# Patient Record
Sex: FEMALE | Race: WHITE | NOT HISPANIC OR LATINO | Employment: PART TIME | URBAN - METROPOLITAN AREA
[De-identification: names, ages, dates, MRNs, and addresses within clinical notes are randomized per-mention and may not be internally consistent; named-entity substitution may affect disease eponyms.]

---

## 2022-05-24 ENCOUNTER — HOSPITAL ENCOUNTER (EMERGENCY)
Facility: HOSPITAL | Age: 32
Discharge: OTHER INSTITUTION | End: 2022-05-25
Attending: EMERGENCY MEDICINE | Admitting: EMERGENCY MEDICINE
Payer: COMMERCIAL

## 2022-05-24 DIAGNOSIS — F31.9 BIPOLAR DISORDER (HCC): ICD-10-CM

## 2022-05-24 DIAGNOSIS — F41.9 ANXIETY: ICD-10-CM

## 2022-05-24 DIAGNOSIS — F43.10 PTSD (POST-TRAUMATIC STRESS DISORDER): ICD-10-CM

## 2022-05-24 DIAGNOSIS — R45.851 DEPRESSION WITH SUICIDAL IDEATION: Primary | ICD-10-CM

## 2022-05-24 DIAGNOSIS — F32.A DEPRESSION WITH SUICIDAL IDEATION: Primary | ICD-10-CM

## 2022-05-24 LAB
AMPHETAMINES SERPL QL SCN: NEGATIVE
BARBITURATES UR QL: NEGATIVE
BENZODIAZ UR QL: NEGATIVE
COCAINE UR QL: NEGATIVE
ETHANOL EXG-MCNC: 0 MG/DL
EXT PREG TEST URINE: NEGATIVE
EXT. CONTROL ED NAV: NORMAL
FLUAV RNA RESP QL NAA+PROBE: NEGATIVE
FLUBV RNA RESP QL NAA+PROBE: NEGATIVE
LITHIUM SERPL-SCNC: 0.4 MMOL/L (ref 0.5–1)
METHADONE UR QL: NEGATIVE
OPIATES UR QL SCN: NEGATIVE
OXYCODONE+OXYMORPHONE UR QL SCN: NEGATIVE
PCP UR QL: NEGATIVE
RSV RNA RESP QL NAA+PROBE: NEGATIVE
SARS-COV-2 RNA RESP QL NAA+PROBE: NEGATIVE
THC UR QL: NEGATIVE

## 2022-05-24 PROCEDURE — 99285 EMERGENCY DEPT VISIT HI MDM: CPT | Performed by: EMERGENCY MEDICINE

## 2022-05-24 PROCEDURE — 99285 EMERGENCY DEPT VISIT HI MDM: CPT

## 2022-05-24 PROCEDURE — 81025 URINE PREGNANCY TEST: CPT

## 2022-05-24 PROCEDURE — 80178 ASSAY OF LITHIUM: CPT

## 2022-05-24 PROCEDURE — 82075 ASSAY OF BREATH ETHANOL: CPT

## 2022-05-24 PROCEDURE — 80307 DRUG TEST PRSMV CHEM ANLYZR: CPT

## 2022-05-24 PROCEDURE — 36415 COLL VENOUS BLD VENIPUNCTURE: CPT

## 2022-05-24 PROCEDURE — 0241U HB NFCT DS VIR RESP RNA 4 TRGT: CPT

## 2022-05-24 RX ORDER — ACYCLOVIR 400 MG/1
1 TABLET ORAL 2 TIMES DAILY
COMMUNITY
Start: 2022-02-10

## 2022-05-24 RX ORDER — TRAZODONE HYDROCHLORIDE 50 MG/1
50 TABLET ORAL DAILY PRN
COMMUNITY
Start: 2022-03-01

## 2022-05-24 RX ORDER — ACYCLOVIR 200 MG/1
400 CAPSULE ORAL EVERY 12 HOURS SCHEDULED
Status: DISCONTINUED | OUTPATIENT
Start: 2022-05-24 | End: 2022-05-25 | Stop reason: HOSPADM

## 2022-05-24 RX ORDER — LAMOTRIGINE 200 MG/1
200 TABLET ORAL DAILY
COMMUNITY
Start: 2022-04-14

## 2022-05-24 RX ORDER — LITHIUM CARBONATE 300 MG
300 TABLET ORAL 2 TIMES DAILY
COMMUNITY
Start: 2022-04-14

## 2022-05-24 RX ORDER — METHYLPHENIDATE HYDROCHLORIDE 18 MG/1
18 TABLET, EXTENDED RELEASE ORAL DAILY
COMMUNITY
Start: 2022-05-17 | End: 2022-07-16

## 2022-05-24 RX ORDER — LAMOTRIGINE 100 MG/1
200 TABLET ORAL ONCE
Status: COMPLETED | OUTPATIENT
Start: 2022-05-25 | End: 2022-05-25

## 2022-05-24 RX ORDER — LITHIUM CARBONATE 300 MG/1
300 CAPSULE ORAL 2 TIMES DAILY WITH MEALS
Status: DISCONTINUED | OUTPATIENT
Start: 2022-05-24 | End: 2022-05-25 | Stop reason: HOSPADM

## 2022-05-24 RX ORDER — TRAZODONE HYDROCHLORIDE 50 MG/1
50 TABLET ORAL ONCE
Status: COMPLETED | OUTPATIENT
Start: 2022-05-24 | End: 2022-05-24

## 2022-05-24 RX ORDER — METHYLPHENIDATE HYDROCHLORIDE 10 MG/1
18 TABLET ORAL ONCE
Status: DISCONTINUED | OUTPATIENT
Start: 2022-05-25 | End: 2022-05-25 | Stop reason: HOSPADM

## 2022-05-24 RX ADMIN — TRAZODONE HYDROCHLORIDE 50 MG: 50 TABLET ORAL at 22:08

## 2022-05-24 RX ADMIN — LITHIUM CARBONATE 300 MG: 300 CAPSULE, GELATIN COATED ORAL at 17:31

## 2022-05-24 RX ADMIN — ACYCLOVIR 400 MG: 200 CAPSULE ORAL at 17:31

## 2022-05-24 NOTE — ED NOTES
Pt comes to the ed after calling the national suicide hotline  Pt sees Dr Oziel Goldberg outpatient and has a therapist  Pt had a med change in Feb or March  Nothing since then  Pt reports increased depression and anxiety  She was at [facility] partial program Transitions a couple months ago but felt worse attending the program  Pt reports having thoughts of suicide and left work today due to overwhelming feelings of sadness and hopelessness  Pt said her family and  care about her but don't understand her depression and therefore are not helpful  Pt is med compliant and denies any D&A use or legal issues  No medical issues reported  Pt denies homicidal ideations as well as hallucinations  Pt reports cutting as a child but not since  Pt has difficulty focusing  She reports feeling like a burden to her   They had an argument last night about pts depression and not seeing any results through treatment yet  Pt reports appetite as good and sleep is good with use of Trazadone  She is requesting inpatient psych tx and will sign a 201  Pt is calm and cooperative

## 2022-05-24 NOTE — ED PROVIDER NOTES
History  Chief Complaint   Patient presents with    Suicidal     Ideation  Has a plan in mind (Helium), but not exact time or timeframe  States that if given the right emotional state, she "would not feel safe alone" Pt has a Hx of psych - therapy, and on Trazadone qHS, Lamictal, and Lithium  Stopped Prozac in Feb/March  Patient is a 34year old female with PMHx of depression, anxiety, bipolar disorder, borderline personality disorder, PTSD, binge eating disorder, presenting to the ED for evaluation of suicidal ideation  Patient states that she has been thinking of suicide for a very long time    Today on her way home from work, she was going to  a helium tank and inhaled the entire take an attempted suicide  Instead, she called her , who instructed her to call crisis worker which she did  Patient was instructed to present to the ED for evaluation  Patient reports long history of psychiatric illness  She was previously in an inpatient center, but states this did not help her that much  She reports recently being diagnosed with carpal tunnel syndrome, occasionally experiences pain to the wrist from this, but well controlled with Tylenol and Motrin at home  She has no other somatic complaints  Patient has no homicidal ideation  Prior to Admission Medications   Prescriptions Last Dose Informant Patient Reported? Taking? Methylphenidate HCl ER 18 MG TB24   Yes Yes   Sig: Take 18 mg by mouth in the morning  Multiple Vitamins-Minerals (MULTIVITAL-M PO)   Yes No   Sig: Take 1 tablet by mouth daily   acyclovir (ZOVIRAX) 400 MG tablet   Yes Yes   Sig: Take 1 tablet by mouth in the morning and 1 tablet in the evening  lamoTRIgine (LaMICtal) 200 MG tablet   Yes Yes   Sig: Take 200 mg by mouth in the morning  lithium 300 MG tablet   Yes Yes   Sig: Take 300 mg by mouth in the morning and 300 mg in the evening     traZODone (DESYREL) 50 mg tablet   Yes Yes   Sig: Take 50 mg by mouth as needed in the morning  Facility-Administered Medications: None       History reviewed  No pertinent past medical history  History reviewed  No pertinent surgical history  History reviewed  No pertinent family history  I have reviewed and agree with the history as documented  E-Cigarette/Vaping     E-Cigarette/Vaping Substances           Review of Systems   Constitutional: Negative for chills and fever  HENT: Negative for ear pain and sore throat  Eyes: Negative for pain and visual disturbance  Respiratory: Negative for cough and shortness of breath  Cardiovascular: Negative for chest pain and palpitations  Gastrointestinal: Negative for abdominal pain and vomiting  Genitourinary: Negative for dysuria and hematuria  Musculoskeletal: Positive for myalgias  Negative for back pain  Skin: Negative for color change and rash  Neurological: Negative for seizures and syncope  All other systems reviewed and are negative  Physical Exam  ED Triage Vitals   Temperature Pulse Respirations Blood Pressure SpO2   05/24/22 1253 05/24/22 1253 05/24/22 1253 05/24/22 1253 05/24/22 1253   98 5 °F (36 9 °C) 64 16 (!) 175/81 97 %      Temp Source Heart Rate Source Patient Position - Orthostatic VS BP Location FiO2 (%)   05/24/22 1253 05/24/22 1253 05/24/22 1253 05/24/22 1253 --   Oral Monitor Sitting Right arm       Pain Score       05/24/22 1600       No Pain             Orthostatic Vital Signs  Vitals:    05/24/22 1600 05/24/22 1827 05/24/22 2210 05/25/22 0615   BP: 157/99 153/73 143/64 135/78   Pulse: 69 58 56 68   Patient Position - Orthostatic VS: Sitting Lying Sitting Sitting       Physical Exam  Vitals and nursing note reviewed  Constitutional:       Appearance: He is well-developed  HENT:      Head: Normocephalic and atraumatic  Eyes:      Conjunctiva/sclera: Conjunctivae normal    Cardiovascular:      Rate and Rhythm: Normal rate and regular rhythm        Heart sounds: No murmur heard   Pulmonary:      Effort: Pulmonary effort is normal  No respiratory distress  Breath sounds: Normal breath sounds  Abdominal:      Palpations: Abdomen is soft  Tenderness: There is no abdominal tenderness  Musculoskeletal:      Cervical back: Neck supple  Skin:     General: Skin is warm and dry  Neurological:      Mental Status: He is alert  ED Medications  Medications   traZODone (DESYREL) tablet 50 mg (50 mg Oral Given 5/24/22 2208)   lamoTRIgine (LaMICtal) tablet 200 mg (200 mg Oral Given 5/25/22 0926)       Diagnostic Studies  Results Reviewed     Procedure Component Value Units Date/Time    Lithium level [[id number]]  (Abnormal) Collected: 05/24/22 1910    Lab Status: Final result Specimen: Blood from Hand, Left Updated: 05/24/22 1941     Lithium Lvl 0 4 mmol/L     POCT alcohol breath test [[id number]]  (Normal) Resulted: 05/24/22 1345    Lab Status: Edited Result - FINAL Updated: 05/24/22 1528     EXTBreath Alcohol 0 000    COVID/FLU/RSV - 2 hour TAT [[id number]]  (Normal) Collected: 05/24/22 1346    Lab Status: Final result Specimen: Nares from Nose Updated: 05/24/22 1434     SARS-CoV-2 Negative     INFLUENZA A PCR Negative     INFLUENZA B PCR Negative     RSV PCR Negative    Narrative:      FOR PEDIATRIC PATIENTS - copy/paste COVID Guidelines URL to browser: [email or web address]  ashx    SARS-CoV-2 assay is a Nucleic Acid Amplification assay intended for the  qualitative detection of nucleic acid from SARS-CoV-2 in nasopharyngeal  swabs  Results are for the presumptive identification of SARS-CoV-2 RNA  Positive results are indicative of infection with SARS-CoV-2, the virus  causing COVID-19, but do not rule out bacterial infection or co-infection  with other viruses  Laboratories within the [place] and its  territories are required to report all positive results to the appropriate  public health authorities   Negative results do not preclude SARS-CoV-2  infection and should not be used as the sole basis for treatment or other  patient management decisions  Negative results must be combined with  clinical observations, patient history, and epidemiological information  This test has not been FDA cleared or approved  This test has been authorized by FDA under an Emergency Use Authorization  (EUA)  This test is only authorized for the duration of time the  declaration that circumstances exist justifying the authorization of the  emergency use of an in vitro diagnostic tests for detection of SARS-CoV-2  virus and/or diagnosis of COVID-19 infection under section 564(b)(1) of  the Act, 21 U  S C  756WVQ-6(J)(8), unless the authorization is terminated  or revoked sooner  The test has been validated but independent review by FDA  and CLIA is pending  Test performed using [employer] Gene[employer]pert: This RT-PCR assay targets N2,  a region unique to SARS-CoV-2  A conserved region in the E-gene was chosen  for pan-Sarbecovirus detection which includes SARS-CoV-2  Rapid drug screen, urine [[id number]]  (Normal) Collected: 05/24/22 1346    Lab Status: Final result Specimen: Urine, Clean Catch Updated: 05/24/22 1428     Amph/Meth UR Negative     Barbiturate Ur Negative     Benzodiazepine Urine Negative     Cocaine Urine Negative     Methadone Urine Negative     Opiate Urine Negative     PCP Ur Negative     THC Urine Negative     Oxycodone Urine Negative    Narrative:      FOR MEDICAL PURPOSES ONLY  IF CONFIRMATION NEEDED PLEASE CONTACT THE LAB WITHIN 5 DAYS      Drug Screen Cutoff Levels:  AMPHETAMINE/METHAMPHETAMINES  1000 ng/mL  BARBITURATES     200 ng/mL  BENZODIAZEPINES     200 ng/mL  COCAINE      300 ng/mL  METHADONE      300 ng/mL  OPIATES      300 ng/mL  PHENCYCLIDINE     25 ng/mL  THC       50 ng/mL  OXYCODONE      100 ng/mL    POCT pregnancy, urine [[id number]]  (Normal) Resulted: 05/24/22 1346    Lab Status: Final result Updated: 05/24/22 1346     EXT PREG TEST UR (Ref: Negative) negative     Control valid                 No orders to display         Procedures  Procedures      ED Course  ED Course as of 05/25/22 1156   Tue May 24, 2022   1505 Patient signed a 12 order with ED crisis worker  Bed search pending  [phone] Patient accepted at [facility] by Dr Jeri Brandt  [place] tomorrow morning 0930  Patient has been medically cleared for psychiatric treatment  Will need 302 if rescinds  ED crisis worker contacted; will evaluate at bedside  COVID swab, breath alcohol, and UDS ordered  201 order signed with the ED crisis worker  Bed search pending  Patient will be transferred to [facility] behavioral health tomorrow morning  Patient's medications were ordered for evening and tomorrow morning doses  Patient resting comfortably, no complaints  Care overturned to Dr Genaro Dominguez, pending further monitoring and transfer  MDM    Disposition  Final diagnoses:   Depression with suicidal ideation   Anxiety   PTSD (post-traumatic stress disorder)   Bipolar disorder ([facility] [address] )     Time reflects when diagnosis was documented in both MDM as applicable and the Disposition within this note     Time User Action Codes Description Comment    5/24/2022  3:06 PM Aubery Adolph Add [F32  Luanne Ledger Depression with suicidal ideation     5/24/2022  3:06 PM Aubery Adolph Add [F41 9] Anxiety     5/24/2022  3:06 PM Aubery Adolph Add [F43 10] PTSD (post-traumatic stress disorder)     5/24/2022  3:06 PM Aubery Adolph Add [F31 9] Bipolar disorder [facility])       ED Disposition     ED Disposition   Transfer to [address] [place] [address]   --    Date/Time   Tue May 24, 2022  3:41 PM    Comment   Jonah Sousa should be transferred out to [facility] tomorrow morning 5/24/22, accepted under Dr Jeri Brandt, and has been medically cleared for psychiatric treatment             MD Documentation    Flowsheet Row Most Recent Value   Patient Condition The patient has been stabilized such that within reasonable medical probability, no material deterioration of the patient condition or the condition of the unborn child(jerry) is likely to result from the transfer   Reason for Transfer Level of Care needed not available at this facility   Benefits of Transfer Specialized equipment and/or services available at the receiving facility (Include comment)________________________   Risks of Transfer Potential for delay in receiving treatment   Accepting Physician [address] Name, [address] [place] [address] by Nhung Capellan and Unit #) CTS   Sending MD [facility]   Provider Certification General risk, such as traffic hazards, adverse weather conditions, rough terrain or turbulence, possible failure of equipment (including vehicle or aircraft), or consequences of actions of persons outside the control of the transport personnel      RN Documentation    Flowsheet Row Most [address] [facility] Name, [address] [place] [address] by Nhung Capellan and Unit #) CTS      Follow-up Information    None         Discharge Medication List as of 5/25/2022  9:40 AM      CONTINUE these medications which have NOT CHANGED    Details   acyclovir (ZOVIRAX) 400 MG tablet Take 1 tablet by mouth in the morning and 1 tablet in the evening , Starting Thu 2/10/2022, Historical Med      lamoTRIgine (LaMICtal) 200 MG tablet Take 200 mg by mouth in the morning , Starting Thu 4/14/2022, Historical Med      lithium 300 MG tablet Take 300 mg by mouth in the morning and 300 mg in the evening , Starting Thu 4/14/2022, Historical Med      Methylphenidate HCl ER 18 MG TB24 Take 18 mg by mouth in the morning , Starting Tue 5/17/2022, Until Sat 7/16/2022, Historical Med      traZODone (DESYREL) 50 mg tablet Take 50 mg by mouth as needed in the morning , Starting Tue 3/1/2022, Historical Med      Multiple Vitamins-Minerals (MULTIVITAL-M PO) Take 1 tablet by mouth daily, Historical Med           No discharge procedures on file     PDMP Review     None           ED Provider  Attending physically available and evaluated Meghanncassie Rios  I managed the patient along with the ED Attending      Electronically Signed by         Eric Jeter DO  05/25/22 8812

## 2022-05-24 NOTE — Clinical Note
Alexa Callejas should be transferred out to [facility], accepted under Dr Lee Ann Smith, and has been medically cleared for psychiatric treatment

## 2022-05-24 NOTE — ED ATTENDING ATTESTATION
Final Diagnosis:  1  Depression with suicidal ideation    2  Anxiety    3  PTSD (post-traumatic stress disorder)    4  Bipolar disorder (HCC)           IJared MD, saw and evaluated the patient  All available labs and X-rays were ordered by me or the resident and have been reviewed by myself  I discussed the patient with the resident / non-physician and agree with the resident's / non-physician practitioner's findings and plan as documented in the resident's / non-physician practicitioner's note, except where noted  At this point, I agree with the current assessment done in the ED  I was present during key portions of all procedures performed unless otherwise stated  Chief Complaint   Patient presents with    Suicidal     Ideation  Has a plan in mind (Helium), but not exact time or timeframe  States that if given the right emotional state, she "would not feel safe alone" Pt has a Hx of psych - therapy, and on Trazadone qHS, Lamictal, and Lithium  Stopped Prozac in Feb/March  This is a 34 y o  female presenting for evaluation of suicidal ideation with a plan somehow involving a helium tank that she'd buy on her way home from work  No trigger  Didn't feel safe so called CRISIS who referred her here  Home medications don't work  SI w/ plan  No HI  No AH/VH  PMH:   has no past medical history on file  PSH:   has no past surgical history on file  Social:  Social History     Substance and Sexual Activity   Alcohol Use None     Social History     Tobacco Use   Smoking Status Not on file   Smokeless Tobacco Not on file     Social History     Substance and Sexual Activity   Drug Use Not on file     PE:  Vitals:    05/24/22 1253 05/24/22 1600   BP: (!) 175/81 157/99   BP Location: Right arm Right arm   Pulse: 64 69   Resp: 16 16   Temp: 98 5 °F (36 9 °C)    TempSrc: Oral    SpO2: 97% 99%   General: VS reviewed  Appears in NAD  awake, alert  Well-nourished, well-developed   Appears stated age  Speaking normally in full sentences  Head: Normocephalic, atraumatic  Eyes: EOM-I  No diplopia  No hyphema  No subconjunctival hemorrhages  Symmetrical lids  ENT: Atraumatic external nose and ears  MMM  No malocclusion  No stridor  Normal phonation  No drooling  Normal swallowing  Neck: No JVD  CV: No pallor noted  Lungs:   No tachypnea  No respiratory distress  MSK:   FROM spontaneously  Skin: Dry, intact  Neuro: Awake, alert, GCS15, CN II-XII grossly intact  Motor grossly intact  Psychiatric/Behavioral: Appropriate mood and affect   Exam: deferred  A:  - SI w/ plan  P:  - CRISIS: 201     - 13 point ROS was performed and all are normal unless stated in the history above  - Nursing note reviewed  Vitals reviewed  - Orders placed by myself and/or advanced practitioner / resident     - Previous chart was reviewed  - No language barrier    - History obtained from patient  - There are no limitations to the history obtained  - Critical care time: Not applicable for this patient       Code Status: No Order  Advance Directive and Living Will:      Power of :    POLST:      Medications   traZODone (DESYREL) tablet 50 mg (has no administration in time range)   acyclovir (ZOVIRAX) capsule 400 mg (has no administration in time range)   lamoTRIgine (LaMICtal) tablet 200 mg (has no administration in time range)   lithium carbonate capsule 300 mg (has no administration in time range)   methylphenidate (RITALIN) tablet 17 5 mg (has no administration in time range)     No orders to display     Orders Placed This Encounter   Procedures    COVID/FLU/RSV - 2 hour TAT    Rapid drug screen, urine    Lithium level    Diet Regular; Finger Foods    Continual Observation    Inpatient Consult to ED Crisis Worker    POCT alcohol breath test    POCT pregnancy, urine     Labs Reviewed   COVID19, INFLUENZA A/B, RSV PCR, [facility] - Normal       Result Value Ref Range Status    SARS-CoV-2 Negative Negative Final    Comment:      INFLUENZA A PCR Negative  Negative Final    Comment:      INFLUENZA B PCR Negative  Negative Final    Comment:      RSV PCR Negative  Negative Final    Comment:      Narrative:     FOR PEDIATRIC PATIENTS - copy/paste COVID Guidelines URL to browser: [email or web address]  [employer]    SARS-CoV-2 assay is a Nucleic Acid Amplification assay intended for the  qualitative detection of nucleic acid from SARS-CoV-2 in nasopharyngeal  swabs  Results are for the presumptive identification of SARS-CoV-2 RNA  Positive results are indicative of infection with SARS-CoV-2, the virus  causing COVID-19, but do not rule out bacterial infection or co-infection  with other viruses  Laboratories within the [place] and its  territories are required to report all positive results to the appropriate  public health authorities  Negative results do not preclude SARS-CoV-2  infection and should not be used as the sole basis for treatment or other  patient management decisions  Negative results must be combined with  clinical observations, patient history, and epidemiological information  This test has not been FDA cleared or approved  This test has been authorized by FDA under an Emergency Use Authorization  (EUA)  This test is only authorized for the duration of time the  declaration that circumstances exist justifying the authorization of the  emergency use of an in vitro diagnostic tests for detection of SARS-CoV-2  virus and/or diagnosis of COVID-19 infection under section 564(b)(1) of  the Act, 21 U  S C  191BZZ-5(R)(1), unless the authorization is terminated  or revoked sooner  The test has been validated but independent review by FDA  and CLIA is pending  Test performed using [employer] GeneXpert: This RT-PCR assay targets N2,  a region unique to SARS-CoV-2   A conserved region in the E-gene was chosen  for pan-Sarbecovirus detection which includes SARS-CoV-2  RAPID DRUG SCREEN, URINE - Normal    Amph/Meth UR Negative  Negative Final    Barbiturate Ur Negative  Negative Final    Benzodiazepine Urine Negative  Negative Final    Cocaine Urine Negative  Negative Final    Methadone Urine Negative  Negative Final    Opiate Urine Negative  Negative Final    PCP Ur Negative  Negative Final    THC Urine Negative  Negative Final    Oxycodone Urine Negative  Negative Final    Narrative:     FOR MEDICAL PURPOSES ONLY  IF CONFIRMATION NEEDED PLEASE CONTACT THE LAB WITHIN 5 DAYS  Drug Screen Cutoff Levels:  AMPHETAMINE/METHAMPHETAMINES  1000 ng/mL  BARBITURATES     200 ng/mL  BENZODIAZEPINES     200 ng/mL  COCAINE      300 ng/mL  METHADONE      300 ng/mL  OPIATES      300 ng/mL  PHENCYCLIDINE     25 ng/mL  THC       50 ng/mL  OXYCODONE      100 ng/mL   POCT ALCOHOL BREATH TEST - Normal    EXTBreath Alcohol 0 000   Final   POCT PREGNANCY, URINE - Normal    EXT PREG TEST UR (Ref: Negative) negative   Final    Control valid   Final   LITHIUM LEVEL     Time reflects when diagnosis was documented in both MDM as applicable and the Disposition within this note       Time User Action Codes Description Comment    5/24/2022  3:06 PM Milvia Geovanny Add Linda Covelo  Inga Main Depression with suicidal ideation     5/24/2022  3:06 PM Milvia Geovanny Add [F41 9] Anxiety     5/24/2022  3:06 PM Milvia Geovanny Add [F43 10] PTSD (post-traumatic stress disorder)     5/24/2022  3:06 PM Milvia Geovanny Add [F31 9] Bipolar disorder [facility])           ED Disposition       ED Disposition   Transfer to [address] [place] [address]   --    Date/Time   Tue May 24, 2022  3:41 PM    Comment   Herbert Pires should be transferred out to [facility] tomorrow morning 5/24/22, accepted under Dr Win López, and has been medically cleared for psychiatric treatment                 MD Documentation      Flowsheet Row Most Recent Value   Patient Condition The patient has been stabilized such that within reasonable medical probability, no material deterioration of the patient condition or the condition of the unborn child(jerry) is likely to result from the transfer   Reason for Transfer Level of Care needed not available at this facility   Benefits of Transfer Specialized equipment and/or services available at the receiving facility (Include comment)________________________   Risks of Transfer Potential for delay in receiving treatment   Accepting Physician [address] Name, [address] [place] [address] by Julia and Unit #) CTS   Sending MD [facility]   Provider Certification General risk, such as traffic hazards, adverse weather conditions, rough terrain or turbulence, possible failure of equipment (including vehicle or aircraft), or consequences of actions of persons outside the control of the transport personnel          RN Documentation      Flowsheet Row Most [address] Name, [address] [place] [address] by Gabriele and Unit #) CTS          Follow-up Information    None       Patient's Medications   Discharge Prescriptions    No medications on file     No discharge procedures on file  Prior to Admission Medications   Prescriptions Last Dose Informant Patient Reported? Taking? Methylphenidate HCl ER 18 MG TB24   Yes Yes   Sig: Take 18 mg by mouth in the morning  Multiple Vitamins-Minerals (MULTIVITAL-M PO)   Yes No   Sig: Take 1 tablet by mouth daily   acyclovir (ZOVIRAX) 400 MG tablet   Yes Yes   Sig: Take 1 tablet by mouth in the morning and 1 tablet in the evening  lamoTRIgine (LaMICtal) 200 MG tablet   Yes Yes   Sig: Take 200 mg by mouth in the morning  lithium 300 MG tablet   Yes Yes   Sig: Take 300 mg by mouth in the morning and 300 mg in the evening  traZODone (DESYREL) 50 mg tablet   Yes Yes   Sig: Take 50 mg by mouth as needed in the morning        Facility-Administered Medications: None       Portions of the record may have been created with voice recognition software  Occasional wrong word or "sound a like" substitutions may have occurred due to the inherent limitations of voice recognition software  Read the chart carefully and recognize, using context, where substitutions have occurred      Electronically signed by:  Jonelle Conway

## 2022-05-24 NOTE — ED NOTES
Patient is accepted at [facility]  Patient is accepted by Dr Mason Postal      Waiting for transport time  Patient may go to the floor after 10am Wednesday    No report necessary     [facility] admissions will complete the insurance auth

## 2022-05-24 NOTE — ED NOTES
Pt ambulated to bathroom  Steady gait       Jose F Lew RN  05/24/22 Jimmy 6 Cheyanne Monroe RN  05/24/22 [id number]

## 2022-05-24 NOTE — ED NOTES
Per Crisis, pt pickup scheduled at 0930 tomorrow to [facility] [place]  Provider aware       Nessa Su RN  05/24/22 8482

## 2022-05-24 NOTE — ED NOTES
Pt  sitting in room with her, playing cards  No complaints       Kellen Mckeon, JORDON  05/24/22 [id number]

## 2022-05-24 NOTE — ED NOTES
Belongings placed in locker H  Pt would like  to be able to take all belongings   Medication logged and kept with belongings due to pt requesting  to take them along home as well     Sherry Brown RN  05/24/22 [id number]

## 2022-05-24 NOTE — ED NOTES
There are no beds available in network  Faxed clinical to [facility] for review of a bed for tomorrow

## 2022-05-24 NOTE — ED NOTES
Pt sleeping in bed in room, lights dimmed for comfort, 1:1 at bedside     Tutu Jay RN  05/24/22 [id number]

## 2022-05-25 VITALS
SYSTOLIC BLOOD PRESSURE: 135 MMHG | RESPIRATION RATE: 18 BRPM | OXYGEN SATURATION: 98 % | TEMPERATURE: 98.5 F | HEART RATE: 68 BPM | DIASTOLIC BLOOD PRESSURE: 78 MMHG

## 2022-05-25 RX ADMIN — LITHIUM CARBONATE 300 MG: 300 CAPSULE, GELATIN COATED ORAL at 09:26

## 2022-05-25 RX ADMIN — ACYCLOVIR 400 MG: 200 CAPSULE ORAL at 09:26

## 2022-05-25 RX ADMIN — LAMOTRIGINE 200 MG: 100 TABLET ORAL at 09:26

## 2022-05-25 NOTE — ED NOTES
Pt awake, pleasantly conversing with 1:1 staff, no complaints at this time     Saranya JORDON Berger  05/25/22 5399

## 2022-05-25 NOTE — ED NOTES
Pt still sleeping in bed, lights dimmed or comfort, 1:1 continued at bedside     [facility]  05/25/22 0518

## 2022-05-25 NOTE — ED NOTES
Pt asleep in room, no distress or concerns at this time, 1:1 continued     Jordan Alvarez, RN  05/24/22 5174

## 2022-05-25 NOTE — ED NOTES
Pt sleeping, 1:1 at bedside     Carlos Calvin, [address] [place] [address]  05/25/22 5990

## 2022-05-25 NOTE — ED NOTES
Pt sleeping, no complaints or distress at this time     Alfonso Saha, [facility]  05/25/22 4252

## 2022-05-25 NOTE — ED NOTES
in room with pt, TV guide given to pt per request, 1:1 continued at bedside     Napoleon Garcia RN  05/24/22 6227